# Patient Record
Sex: FEMALE | Race: WHITE | NOT HISPANIC OR LATINO | Employment: OTHER | URBAN - METROPOLITAN AREA
[De-identification: names, ages, dates, MRNs, and addresses within clinical notes are randomized per-mention and may not be internally consistent; named-entity substitution may affect disease eponyms.]

---

## 2017-10-17 ENCOUNTER — HOSPITAL ENCOUNTER (EMERGENCY)
Facility: HOSPITAL | Age: 77
Discharge: HOME | End: 2017-10-17
Attending: EMERGENCY MEDICINE | Admitting: EMERGENCY MEDICINE
Payer: MEDICARE

## 2017-10-17 ENCOUNTER — APPOINTMENT (EMERGENCY)
Dept: RADIOLOGY | Facility: HOSPITAL | Age: 77
End: 2017-10-17
Payer: MEDICARE

## 2017-10-17 ENCOUNTER — APPOINTMENT (EMERGENCY)
Dept: CT IMAGING | Facility: HOSPITAL | Age: 77
End: 2017-10-17
Payer: MEDICARE

## 2017-10-17 VITALS
SYSTOLIC BLOOD PRESSURE: 215 MMHG | HEIGHT: 67 IN | WEIGHT: 148.59 LBS | BODY MASS INDEX: 23.32 KG/M2 | HEART RATE: 78 BPM | DIASTOLIC BLOOD PRESSURE: 90 MMHG | RESPIRATION RATE: 20 BRPM | OXYGEN SATURATION: 99 %

## 2017-10-17 DIAGNOSIS — S29.9XXA INJURY OF CHEST WALL, INITIAL ENCOUNTER: ICD-10-CM
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
## 2017-10-17 LAB
ALBUMIN SERPL BCP-MCNC: 3.5 G/DL (ref 3.5–5)
ALP SERPL-CCNC: 109 U/L (ref 46–116)
ALT SERPL W P-5'-P-CCNC: 29 U/L (ref 12–78)
ANION GAP SERPL CALCULATED.3IONS-SCNC: 10 MMOL/L (ref 4–13)
AST SERPL W P-5'-P-CCNC: 25 U/L (ref 5–45)
BASOPHILS # BLD AUTO: 0.02 THOUSANDS/ΜL (ref 0–0.1)
BASOPHILS NFR BLD AUTO: 0 % (ref 0–1)
BILIRUB SERPL-MCNC: 0.5 MG/DL (ref 0.2–1)
BUN SERPL-MCNC: 24 MG/DL (ref 5–25)
CALCIUM SERPL-MCNC: 9.9 MG/DL (ref 8.3–10.1)
CHLORIDE SERPL-SCNC: 99 MMOL/L (ref 100–108)
CO2 SERPL-SCNC: 27 MMOL/L (ref 21–32)
CREAT SERPL-MCNC: 1.06 MG/DL (ref 0.6–1.3)
EOSINOPHIL # BLD AUTO: 0.05 THOUSAND/ΜL (ref 0–0.61)
EOSINOPHIL NFR BLD AUTO: 0 % (ref 0–6)
ERYTHROCYTE [DISTWIDTH] IN BLOOD BY AUTOMATED COUNT: 13.9 % (ref 11.6–15.1)
GFR SERPL CREATININE-BSD FRML MDRD: 50 ML/MIN/1.73SQ M
GLUCOSE SERPL-MCNC: 127 MG/DL (ref 65–140)
HCT VFR BLD AUTO: 39.5 % (ref 34.8–46.1)
HGB BLD-MCNC: 12.4 G/DL (ref 11.5–15.4)
LYMPHOCYTES # BLD AUTO: 0.58 THOUSANDS/ΜL (ref 0.6–4.47)
LYMPHOCYTES NFR BLD AUTO: 4 % (ref 14–44)
MCH RBC QN AUTO: 28.5 PG (ref 26.8–34.3)
MCHC RBC AUTO-ENTMCNC: 31.4 G/DL (ref 31.4–37.4)
MCV RBC AUTO: 91 FL (ref 82–98)
MONOCYTES # BLD AUTO: 0.89 THOUSAND/ΜL (ref 0.17–1.22)
MONOCYTES NFR BLD AUTO: 7 % (ref 4–12)
NEUTROPHILS # BLD AUTO: 11.9 THOUSANDS/ΜL (ref 1.85–7.62)
NEUTS SEG NFR BLD AUTO: 89 % (ref 43–75)
PLATELET # BLD AUTO: 345 THOUSANDS/UL (ref 149–390)
PMV BLD AUTO: 10.6 FL (ref 8.9–12.7)
POTASSIUM SERPL-SCNC: 3.3 MMOL/L (ref 3.5–5.3)
PROT SERPL-MCNC: 6.9 G/DL (ref 6.4–8.2)
RBC # BLD AUTO: 4.35 MILLION/UL (ref 3.81–5.12)
SODIUM SERPL-SCNC: 136 MMOL/L (ref 136–145)
TROPONIN I SERPL-MCNC: <0.02 NG/ML
WBC # BLD AUTO: 13.44 THOUSAND/UL (ref 4.31–10.16)

## 2017-10-17 PROCEDURE — 70450 CT HEAD/BRAIN W/O DYE: CPT

## 2017-10-17 PROCEDURE — 36415 COLL VENOUS BLD VENIPUNCTURE: CPT | Performed by: PHYSICIAN ASSISTANT

## 2017-10-17 PROCEDURE — 71100 X-RAY EXAM RIBS UNI 2 VIEWS: CPT

## 2017-10-17 PROCEDURE — 93005 ELECTROCARDIOGRAM TRACING: CPT

## 2017-10-17 PROCEDURE — 80053 COMPREHEN METABOLIC PANEL: CPT | Performed by: PHYSICIAN ASSISTANT

## 2017-10-17 PROCEDURE — 85025 COMPLETE CBC W/AUTO DIFF WBC: CPT | Performed by: PHYSICIAN ASSISTANT

## 2017-10-17 PROCEDURE — 84484 ASSAY OF TROPONIN QUANT: CPT | Performed by: PHYSICIAN ASSISTANT

## 2017-10-17 PROCEDURE — 96360 HYDRATION IV INFUSION INIT: CPT

## 2017-10-17 PROCEDURE — 71020 HB CHEST X-RAY 2VW FRONTAL&LATL: CPT

## 2017-10-17 PROCEDURE — 99285 EMERGENCY DEPT VISIT HI MDM: CPT

## 2017-10-17 RX ORDER — VALSARTAN AND HYDROCHLOROTHIAZIDE 80; 12.5 MG/1; MG/1
1 TABLET, FILM COATED ORAL DAILY
COMMUNITY

## 2017-10-17 RX ORDER — ESCITALOPRAM OXALATE 5 MG/1
5 TABLET ORAL DAILY
COMMUNITY

## 2017-10-17 RX ORDER — OXYCODONE HYDROCHLORIDE AND ACETAMINOPHEN 5; 325 MG/1; MG/1
1 TABLET ORAL ONCE
Status: COMPLETED | OUTPATIENT
Start: 2017-10-17 | End: 2017-10-17

## 2017-10-17 RX ORDER — OMEGA-3S/DHA/EPA/FISH OIL/D3 300MG-1000
400 CAPSULE ORAL DAILY
COMMUNITY

## 2017-10-17 RX ORDER — ATORVASTATIN CALCIUM 20 MG/1
20 TABLET, FILM COATED ORAL DAILY
COMMUNITY

## 2017-10-17 RX ORDER — OXYCODONE HYDROCHLORIDE AND ACETAMINOPHEN 5; 325 MG/1; MG/1
1 TABLET ORAL EVERY 6 HOURS PRN
Qty: 12 TABLET | Refills: 0 | Status: SHIPPED | OUTPATIENT
Start: 2017-10-17 | End: 2017-10-27

## 2017-10-17 RX ORDER — MEMANTINE HYDROCHLORIDE 5 MG-10 MG
28 KIT ORAL DAILY
COMMUNITY

## 2017-10-17 RX ORDER — UBIDECARENONE 75 MG
CAPSULE ORAL DAILY
COMMUNITY

## 2017-10-17 RX ORDER — DONEPEZIL HYDROCHLORIDE 5 MG/1
5 TABLET, FILM COATED ORAL
COMMUNITY

## 2017-10-17 RX ADMIN — SODIUM CHLORIDE 1000 ML: 0.9 INJECTION, SOLUTION INTRAVENOUS at 15:42

## 2017-10-17 RX ADMIN — OXYCODONE HYDROCHLORIDE AND ACETAMINOPHEN 1 TABLET: 5; 325 TABLET ORAL at 17:10

## 2017-10-17 NOTE — DISCHARGE INSTRUCTIONS
Chest Wall Pain   WHAT YOU NEED TO KNOW:   Chest wall pain may be caused by problems with the muscles, cartilage, or bones of the chest wall  Chest wall pain may also be caused by pain that spreads to your chest from another part of your body  The pain may be aching, severe, dull, or sharp  It may come and go, or it may be constant  The pain may be worse when you move in certain ways, breathe deeply, or cough  DISCHARGE INSTRUCTIONS:   Call 911 if:   · You have any of the following signs of a heart attack:      ¨ Squeezing, pressure, or pain in your chest that lasts longer than 5 minutes or returns    ¨ Discomfort or pain in your back, neck, jaw, stomach, or arm     ¨ Trouble breathing    ¨ Nausea or vomiting    ¨ Lightheadedness or a sudden cold sweat, especially with chest pain or trouble breathing    Return to the emergency department if:   · You have severe pain  Contact your healthcare provider if:   · You develop a rash  · You have other new symptoms  · Your pain does not improve, even with treatment  · You have questions or concerns about your condition or care  Medicines: You may need any of the following:  · NSAIDs , such as ibuprofen, help decrease swelling, pain, and fever  This medicine is available with or without a doctor's order  NSAIDs can cause stomach bleeding or kidney problems in certain people  If you take blood thinner medicine, always ask your healthcare provider if NSAIDs are safe for you  Always read the medicine label and follow directions  · Acetaminophen  decreases pain  It is available without a doctor's order  Ask how much to take and how often to take it  Follow directions  Acetaminophen can cause liver damage if not taken correctly  · A cream  may be applied to your chest to decrease pain  · Take your medicine as directed  Contact your healthcare provider if you think your medicine is not helping or if you have side effects   Tell him of her if you are allergic to any medicine  Keep a list of the medicines, vitamins, and herbs you take  Include the amounts, and when and why you take them  Bring the list or the pill bottles to follow-up visits  Carry your medicine list with you in case of an emergency  Follow up with your healthcare provider as directed:  Write down your questions so you remember to ask them during your visits  Self-care:   · Rest  as needed  Avoid activities that make your chest wall pain worse  · Apply heat  on your chest for 20 to 30 minutes every 2 hours for as many days as directed  Heat helps decrease pain and muscle spasms  · Apply ice  on your chest for 15 to 20 minutes every hour or as directed  Use an ice pack, or put crushed ice in a plastic bag  Cover it with a towel  Ice helps prevent tissue damage and decreases swelling and pain  © 2017 [employer]  Information is for End User's use only and may not be sold, redistributed or otherwise used for commercial purposes  All illustrations and images included in CareNotes® are the copyrighted property of [employer]  or [employer]  The above information is an  only  It is not intended as medical advice for individual conditions or treatments  Talk to your doctor, nurse or pharmacist before following any medical regimen to see if it is safe and effective for you

## 2017-10-17 NOTE — ED NOTES
Pt awake and alert, no distress noted, no other questions upon d/c     April ANNI Abernathy, RN  10/17/17 8101

## 2017-10-17 NOTE — ED PROVIDER NOTES
History  Chief Complaint   Patient presents with    Fall     per paramedic patient was helping family with yardwork when she fell backwards, denies loss of consciousness  Patient now complains of right flank area pain  denies pain upon spinal palpation  68-year-old female presents to the emergency department with complaints of right-sided back pain after a fall  Son states that she was getting up from a seated position outside and thinks she may have gotten dizzy  States that she fell backward and hit her back on a pile of wood  Denies head injury  No loss of consciousness  Patient does not remember the fall  Presently complaining of pain in the right side of the upper back  Denies shortness of breath or difficulty breathing  She has not had any changes in her speech or vision since the time of the injury  Denies weakness in the upper or lower extremities  Son states she is currently being followed by Neurology for memory issues and has upcoming MRI  States that he has noticed she has been less steady on her feet over the past 5-7 days but has not had any previous fall  History provided by:  Patient   used: No    Fall   Associated symptoms: chest pain    Associated symptoms: no abdominal pain, no headaches, no nausea, no seizures and no vomiting        Prior to Admission Medications   Prescriptions Last Dose Informant Patient Reported? Taking?   atorvastatin (LIPITOR) 20 mg tablet   Yes Yes   Sig: Take 20 mg by mouth daily   cholecalciferol (VITAMIN D3) 400 units tablet   Yes Yes   Sig: Take 400 Units by mouth daily   cyanocobalamin (VITAMIN B-12) 100 mcg tablet   Yes Yes   Sig: Take by mouth daily   donepezil (ARICEPT) 5 mg tablet   Yes Yes   Sig: Take 5 mg by mouth daily at bedtime   escitalopram (LEXAPRO) 5 mg tablet   Yes Yes   Sig: Take 5 mg by mouth daily   memantine (NAMENDA TITRATION PACK)   Yes Yes   Sig: Take 28 mg by mouth daily Follow package directions  valsartan-hydrochlorothiazide (DIOVAN-HCT) 80-12 5 MG per tablet   Yes Yes   Sig: Take 1 tablet by mouth daily      Facility-Administered Medications: None       Past Medical History:   Diagnosis Date    Hypertension        History reviewed  No pertinent surgical history  History reviewed  No pertinent family history  I have reviewed and agree with the history as documented  Social History   Substance Use Topics    Smoking status: Never Smoker    Smokeless tobacco: Not on file    Alcohol use Not on file        Review of Systems   Constitutional: Negative for activity change, appetite change, chills and fever  HENT: Negative for congestion, dental problem, drooling, ear discharge, ear pain, mouth sores, nosebleeds, rhinorrhea, sore throat and trouble swallowing  Eyes: Negative for pain, discharge and itching  Respiratory: Negative for cough, chest tightness, shortness of breath and wheezing  Cardiovascular: Positive for chest pain  Negative for palpitations  Gastrointestinal: Negative for abdominal pain, blood in stool, constipation, diarrhea, nausea and vomiting  Endocrine: Negative for cold intolerance and heat intolerance  Genitourinary: Negative for difficulty urinating, dysuria, flank pain, frequency and urgency  Skin: Negative for rash and wound  Allergic/Immunologic: Negative for food allergies and immunocompromised state  Neurological: Positive for syncope  Negative for dizziness, seizures, weakness, numbness and headaches  Psychiatric/Behavioral: Negative for agitation, behavioral problems and confusion         Physical Exam  ED Triage Vitals   Temp Pulse Respirations Blood Pressure SpO2   -- 10/17/17 1445 10/17/17 1445 10/17/17 1445 10/17/17 1445    73 16 115/55 96 %      Temp src Heart Rate Source Patient Position - Orthostatic VS BP Location FiO2 (%)   -- 10/17/17 1445 10/17/17 1452 10/17/17 1445 --    Monitor Lying Right arm       Pain Score       10/17/17 1445       1 Physical Exam   Constitutional: She is oriented to person, place, and time  She appears well-developed and well-nourished  No distress  HENT:   Head: Normocephalic and atraumatic  Right Ear: External ear normal    Left Ear: External ear normal    Mouth/Throat: Oropharynx is clear and moist  No oropharyngeal exudate  Eyes: Conjunctivae and EOM are normal  Pupils are equal, round, and reactive to light  Neck: No JVD present  No tracheal deviation present  Cardiovascular: Normal rate, regular rhythm and normal heart sounds  Exam reveals no gallop and no friction rub  No murmur heard  Pulmonary/Chest: Effort normal and breath sounds normal  No respiratory distress  She has no wheezes  She has no rales  She exhibits no tenderness  Abdominal: Soft  Bowel sounds are normal  She exhibits no distension  There is no tenderness  There is no guarding  Musculoskeletal: Normal range of motion  She exhibits no edema, tenderness or deformity  Lymphadenopathy:     She has no cervical adenopathy  Neurological: She is alert and oriented to person, place, and time  Skin: Skin is warm and dry  No rash noted  She is not diaphoretic  No erythema  Psychiatric: She has a normal mood and affect  Her behavior is normal    Nursing note and vitals reviewed        ED Medications  Medications    EMS REPLENISHMENT MED ( Does not apply Given to EMS 10/17/17 1500)   sodium chloride 0 9 % bolus 1,000 mL (1,000 mL Intravenous New Bag 10/17/17 1542)   oxyCODONE-acetaminophen (PERCOCET) 5-325 mg per tablet 1 tablet (1 tablet Oral Given 10/17/17 1710)       Diagnostic Studies  Labs Reviewed   CBC AND DIFFERENTIAL - Abnormal        Result Value Ref Range Status    WBC 13 44 (*) 4 31 - 10 16 Thousand/uL Final    Neutrophils Relative 89 (*) 43 - 75 % Final    Lymphocytes Relative 4 (*) 14 - 44 % Final    Neutrophils Absolute 11 90 (*) 1 85 - 7 62 Thousands/µL Final    Lymphocytes Absolute 0 58 (*) 0 60 - 4 47 Thousands/µL Final    RBC 4 35  3 81 - 5 12 Million/uL Final    Hemoglobin 12 4  11 5 - 15 4 g/dL Final    Hematocrit 39 5  34 8 - 46 1 % Final    MCV 91  82 - 98 fL Final    MCH 28 5  26 8 - 34 3 pg Final    MCHC 31 4  31 4 - 37 4 g/dL Final    RDW 13 9  11 6 - 15 1 % Final    MPV 10 6  8 9 - 12 7 fL Final    Platelets 751  599 - 390 Thousands/uL Final    Monocytes Relative 7  4 - 12 % Final    Eosinophils Relative 0  0 - 6 % Final    Basophils Relative 0  0 - 1 % Final    Monocytes Absolute 0 89  0 17 - 1 22 Thousand/µL Final    Eosinophils Absolute 0 05  0 00 - 0 61 Thousand/µL Final    Basophils Absolute 0 02  0 00 - 0 10 Thousands/µL Final   COMPREHENSIVE METABOLIC PANEL - Abnormal     Potassium 3 3 (*) 3 5 - 5 3 mmol/L Final    Chloride 99 (*) 100 - 108 mmol/L Final    Sodium 136  136 - 145 mmol/L Final    CO2 27  21 - 32 mmol/L Final    Anion Gap 10  4 - 13 mmol/L Final    BUN 24  5 - 25 mg/dL Final    Creatinine 1 06  0 60 - 1 30 mg/dL Final    Comment: Standardized to IDMS reference method    Glucose 127  65 - 140 mg/dL Final    Comment:   If the patient is fasting, the ADA then defines impaired fasting glucose as > 100 mg/dL and diabetes as > or equal to 123 mg/dL  Specimen collection should occur prior to Sulfasalazine administration due to the potential for falsely depressed results  Specimen collection should occur prior to Sulfapyridine administration due to the potential for falsely elevated results  Calcium 9 9  8 3 - 10 1 mg/dL Final    AST 25  5 - 45 U/L Final    Comment:   Specimen collection should occur prior to Sulfasalazine administration due to the potential for falsely depressed results  ALT 29  12 - 78 U/L Final    Comment:   Specimen collection should occur prior to Sulfasalazine administration due to the potential for falsely depressed results       Alkaline Phosphatase 109  46 - 116 U/L Final    Total Protein 6 9  6 4 - 8 2 g/dL Final    Albumin 3 5  3 5 - 5 0 g/dL Final    Total Bilirubin 0 50  0 20 - 1 00 mg/dL Final    eGFR 50  ml/min/1 73sq m Final    Narrative:     National Kidney Disease Education Program recommendations are as follows:  GFR calculation is accurate only with a steady state creatinine  Chronic Kidney disease less than 60 ml/min/1 73 sq  meters  Kidney failure less than 15 ml/min/1 73 sq  meters  TROPONIN I - Normal    Troponin I <0 02  <=0 04 ng/mL Final    Narrative:     [employer] Chemistry analyzer 99% cutoff is > 0 04 ng/mL in network labs    o cTnI 99% cutoff is useful only when applied to patients in the clinical setting of myocardial ischemia  o cTnI 99% cutoff should be interpreted in the context of clinical history, ECG findings and possibly cardiac imaging to establish correct diagnosis  o cTnI 99% cutoff may be suggestive but clearly not indicative of a coronary event without the clinical setting of myocardial ischemia  POCT URINALYSIS DIPSTICK       XR ribs 2 vw right   ED Interpretation   No fracture      Final Result      No displaced rib fracture  Findings concur with the preliminary report by the referring clinician already in PACS and/or our electronic record EPIC  Workstation performed: [id number]         XR chest 2 views   ED Interpretation   Clear lungs      Final Result      No active pulmonary disease  Workstation performed: [id number]         CT head without contrast   Final Result   1  No evidence of acute intracranial hemorrhage  2  Volume loss/atrophy and microangiopathy   3  Enophthalmos  4  Old appearing nasal fractures, correlate clinically           Workstation performed: [id number]             Procedures  ECG 12 Lead Documentation  Date/Time: 10/17/2017 3:12 PM  Performed by: Russell Quintero  Authorized by: Suresh Gee     Indications / Diagnosis:  Syncope  Patient location:  ED  Previous ECG:     Previous ECG:  Unavailable  Interpretation:     Interpretation: normal    Rate:     ECG rate:  72    ECG rate assessment: normal    Rhythm:     Rhythm: sinus rhythm    Ectopy:     Ectopy: none    QRS:     QRS axis:  Normal    QRS intervals:  Normal  Conduction:     Conduction: normal    ST segments:     ST segments:  Normal  T waves:     T waves: normal            Phone Contacts  ED Phone Contact    ED Course  ED Course                                MDM  Number of Diagnoses or Management Options  Fall, initial encounter:   Injury of chest wall, initial encounter:   Diagnosis management comments: Differential diagnosis includes but not limited to:  Vasovagal syncope, mechanical fall, contusion of chest wall, rib fracture  Doubt TIA or CVA  Amount and/or Complexity of Data Reviewed  Clinical lab tests: ordered and reviewed  Tests in the radiology section of CPT®: ordered and reviewed      CritCare Time    Disposition  Final diagnoses:   Fall, initial encounter   Injury of chest wall, initial encounter     ED Disposition     ED Disposition Condition Comment    Discharge  Nika Schultz discharge to home/self care  Condition at discharge: Stable        Follow-up Information    None       Patient's Medications   Discharge Prescriptions    OXYCODONE-ACETAMINOPHEN (PERCOCET) 5-325 MG PER TABLET    Take 1 tablet by mouth every 6 (six) hours as needed for moderate pain for up to 10 days Max Daily Amount: 4 tablets       Start Date: 10/17/2017End Date: 10/27/2017       Order Dose: 1 tablet       Quantity: 12 tablet    Refills: 0     No discharge procedures on file      ED Provider  Electronically Signed by       Brenda Trimble PA-C  10/17/17 1119

## 2017-10-19 LAB
ATRIAL RATE: 72 BPM
P AXIS: 80 DEGREES
PR INTERVAL: 130 MS
QRS AXIS: 38 DEGREES
QRSD INTERVAL: 74 MS
QT INTERVAL: 372 MS
QTC INTERVAL: 407 MS
T WAVE AXIS: 40 DEGREES
VENTRICULAR RATE: 72 BPM

## 2019-08-03 ENCOUNTER — HOSPITAL ENCOUNTER (EMERGENCY)
Facility: HOSPITAL | Age: 79
Discharge: HOME | End: 2019-08-03
Attending: EMERGENCY MEDICINE | Admitting: EMERGENCY MEDICINE
Payer: COMMERCIAL

## 2019-08-03 ENCOUNTER — APPOINTMENT (EMERGENCY)
Dept: RADIOLOGY | Facility: HOSPITAL | Age: 79
End: 2019-08-03
Payer: COMMERCIAL

## 2019-08-03 VITALS
SYSTOLIC BLOOD PRESSURE: 132 MMHG | DIASTOLIC BLOOD PRESSURE: 59 MMHG | HEART RATE: 77 BPM | OXYGEN SATURATION: 97 % | BODY MASS INDEX: 24.75 KG/M2 | WEIGHT: 158 LBS | TEMPERATURE: 98.3 F | RESPIRATION RATE: 18 BRPM

## 2019-08-03 DIAGNOSIS — R55 VASOVAGAL NEAR SYNCOPE: Primary | ICD-10-CM

## 2019-08-03 LAB
ALBUMIN SERPL BCP-MCNC: 3.3 G/DL (ref 3.5–5)
ALP SERPL-CCNC: 107 U/L (ref 46–116)
ALT SERPL W P-5'-P-CCNC: 28 U/L (ref 12–78)
ANION GAP SERPL CALCULATED.3IONS-SCNC: 11 MMOL/L (ref 4–13)
AST SERPL W P-5'-P-CCNC: 15 U/L (ref 5–45)
BASOPHILS # BLD AUTO: 0.04 THOUSANDS/ΜL (ref 0–0.1)
BASOPHILS NFR BLD AUTO: 1 % (ref 0–1)
BILIRUB SERPL-MCNC: 0.55 MG/DL (ref 0.2–1)
BUN SERPL-MCNC: 23 MG/DL (ref 5–25)
CALCIUM SERPL-MCNC: 9.2 MG/DL (ref 8.3–10.1)
CHLORIDE SERPL-SCNC: 107 MMOL/L (ref 100–108)
CO2 SERPL-SCNC: 24 MMOL/L (ref 21–32)
CREAT SERPL-MCNC: 1.11 MG/DL (ref 0.6–1.3)
EOSINOPHIL # BLD AUTO: 0.08 THOUSAND/ΜL (ref 0–0.61)
EOSINOPHIL NFR BLD AUTO: 1 % (ref 0–6)
ERYTHROCYTE [DISTWIDTH] IN BLOOD BY AUTOMATED COUNT: 13.3 % (ref 11.6–15.1)
GFR SERPL CREATININE-BSD FRML MDRD: 47 ML/MIN/1.73SQ M
GLUCOSE SERPL-MCNC: 114 MG/DL (ref 65–140)
HCT VFR BLD AUTO: 38.1 % (ref 34.8–46.1)
HGB BLD-MCNC: 12.1 G/DL (ref 11.5–15.4)
IMM GRANULOCYTES # BLD AUTO: 0.03 THOUSAND/UL (ref 0–0.2)
IMM GRANULOCYTES NFR BLD AUTO: 0 % (ref 0–2)
LYMPHOCYTES # BLD AUTO: 1.12 THOUSANDS/ΜL (ref 0.6–4.47)
LYMPHOCYTES NFR BLD AUTO: 13 % (ref 14–44)
MCH RBC QN AUTO: 29.5 PG (ref 26.8–34.3)
MCHC RBC AUTO-ENTMCNC: 31.8 G/DL (ref 31.4–37.4)
MCV RBC AUTO: 93 FL (ref 82–98)
MONOCYTES # BLD AUTO: 0.61 THOUSAND/ΜL (ref 0.17–1.22)
MONOCYTES NFR BLD AUTO: 7 % (ref 4–12)
NEUTROPHILS # BLD AUTO: 6.92 THOUSANDS/ΜL (ref 1.85–7.62)
NEUTS SEG NFR BLD AUTO: 78 % (ref 43–75)
NRBC BLD AUTO-RTO: 0 /100 WBCS
PLATELET # BLD AUTO: 325 THOUSANDS/UL (ref 149–390)
PMV BLD AUTO: 10.6 FL (ref 8.9–12.7)
POTASSIUM SERPL-SCNC: 3.6 MMOL/L (ref 3.5–5.3)
PROT SERPL-MCNC: 6.4 G/DL (ref 6.4–8.2)
RBC # BLD AUTO: 4.1 MILLION/UL (ref 3.81–5.12)
SODIUM SERPL-SCNC: 142 MMOL/L (ref 136–145)
TROPONIN I SERPL-MCNC: <0.02 NG/ML
WBC # BLD AUTO: 8.8 THOUSAND/UL (ref 4.31–10.16)

## 2019-08-03 PROCEDURE — 84484 ASSAY OF TROPONIN QUANT: CPT | Performed by: EMERGENCY MEDICINE

## 2019-08-03 PROCEDURE — 93005 ELECTROCARDIOGRAM TRACING: CPT

## 2019-08-03 PROCEDURE — 99285 EMERGENCY DEPT VISIT HI MDM: CPT

## 2019-08-03 PROCEDURE — 71046 X-RAY EXAM CHEST 2 VIEWS: CPT

## 2019-08-03 PROCEDURE — 99284 EMERGENCY DEPT VISIT MOD MDM: CPT | Performed by: EMERGENCY MEDICINE

## 2019-08-03 PROCEDURE — 96360 HYDRATION IV INFUSION INIT: CPT

## 2019-08-03 PROCEDURE — 85025 COMPLETE CBC W/AUTO DIFF WBC: CPT | Performed by: EMERGENCY MEDICINE

## 2019-08-03 PROCEDURE — 36415 COLL VENOUS BLD VENIPUNCTURE: CPT | Performed by: EMERGENCY MEDICINE

## 2019-08-03 PROCEDURE — 80053 COMPREHEN METABOLIC PANEL: CPT | Performed by: EMERGENCY MEDICINE

## 2019-08-03 RX ADMIN — SODIUM CHLORIDE 1000 ML: 0.9 INJECTION, SOLUTION INTRAVENOUS at 15:48

## 2019-08-03 NOTE — ED ATTENDING ATTESTATION
Fidel Zamorano DO, saw and evaluated the patient  I have discussed the patient with the resident/non-physician practitioner and agree with the resident's/non-physician practitioner's findings, Plan of Care, and MDM as documented in the resident's/non-physician practitioner's note, except where noted  All available labs and Radiology studies were reviewed  I was present for key portions of any procedure(s) performed by the resident/non-physician practitioner and I was immediately available to provide assistance  At this point I agree with the current assessment done in the Emergency Department  I have conducted an independent evaluation of this patient a history and physical is as follows:      [de-identified]year old female with near syncope  Patient was walking around music fast   She became lightheaded  She sat down and felt better  Has a history dementia, history is limited family can provide some of the history    Plan is check basic labs IV fluids reassess for likely discharge      Critical Care Time  Procedures

## 2019-08-03 NOTE — ED PROVIDER NOTES
History  Chief Complaint   Patient presents with    Weakness - Generalized     [place] [facility] [place]  Family states that pt has not drank much water     HPI  [de-identified] y o  Female with PMH hypertension and Alzheimer's dementia presents to the ED s/p episode of near syncope this afternoon  Pt was reportedly outside at [place] walking with her daughter in law when she began to feel lightheaded  They sat her on a chair under a tent and gave her some water and pt began to feel better  Son reports pt has had one similar episode to this 2 months ago while walking in [address], and she again improved after sitting for several minutes  Pt is not oriented to date or time at baseline  She does not remember the event today, but she has not complaints at this time and denies lightheadedness, dizziness, headache, chest pain, palpitations, shortness of breath, or abdominal pain  She has not been sick recently with cough, cold, or fever  No hx cardiac problems  She did not fall or hit her head or lose consciousness  Prior to Admission Medications   Prescriptions Last Dose Informant Patient Reported? Taking?   atorvastatin (LIPITOR) 20 mg tablet   Yes Yes   Sig: Take 20 mg by mouth daily   cholecalciferol (VITAMIN D3) 400 units tablet   Yes Yes   Sig: Take 400 Units by mouth daily   cyanocobalamin (VITAMIN B-12) 100 mcg tablet   Yes Yes   Sig: Take by mouth daily   donepezil (ARICEPT) 5 mg tablet   Yes Yes   Sig: Take 5 mg by mouth daily at bedtime   escitalopram (LEXAPRO) 5 mg tablet   Yes Yes   Sig: Take 5 mg by mouth daily   memantine (NAMENDA TITRATION PACK)   Yes No   Sig: Take 28 mg by mouth daily Follow package directions  valsartan-hydrochlorothiazide (DIOVAN-HCT) 80-12 5 MG per tablet   Yes Yes   Sig: Take 1 tablet by mouth daily      Facility-Administered Medications: None       Past Medical History:   Diagnosis Date    Hypertension        History reviewed   No pertinent surgical history  History reviewed  No pertinent family history  I have reviewed and agree with the history as documented  Social History     Tobacco Use    Smoking status: Never Smoker    Smokeless tobacco: Never Used   Substance Use Topics    Alcohol use: Never     Frequency: Never    Drug use: No        Review of Systems   Constitutional: Negative for chills and fever  HENT: Negative for congestion, rhinorrhea and sore throat  Eyes: Negative for visual disturbance  Respiratory: Negative for chest tightness and shortness of breath  Cardiovascular: Negative for chest pain  Gastrointestinal: Negative for abdominal pain, diarrhea, nausea and vomiting  Genitourinary: Negative for dysuria and hematuria  Musculoskeletal: Negative for arthralgias and myalgias  Skin: Negative for rash  Neurological: Positive for syncope  Negative for dizziness, weakness, light-headedness, numbness and headaches  Psychiatric/Behavioral: Positive for confusion  All other systems reviewed and are negative  Physical Exam  ED Triage Vitals   Temperature Pulse Respirations Blood Pressure SpO2   08/03/19 1521 08/03/19 1521 08/03/19 1521 08/03/19 1523 08/03/19 1521   98 3 °F (36 8 °C) 84 18 108/58 98 %      Temp Source Heart Rate Source Patient Position - Orthostatic VS BP Location FiO2 (%)   08/03/19 1521 08/03/19 1521 08/03/19 1521 08/03/19 1521 --   Oral Monitor Sitting Right arm       Pain Score       08/03/19 1521       No Pain             Orthostatic Vital Signs  Vitals:    08/03/19 1521 08/03/19 1523 08/03/19 1618   BP:  108/58 132/59   Pulse: 84  77   Patient Position - Orthostatic VS: Sitting  Lying       Physical Exam   Constitutional: She appears well-developed and well-nourished  No distress  HENT:   Head: Normocephalic and atraumatic  Right Ear: External ear normal    Left Ear: External ear normal    Nose: Nose normal    Eyes: Pupils are equal, round, and reactive to light   Conjunctivae and EOM are normal    Neck: Normal range of motion  Neck supple  Cardiovascular: Normal rate, regular rhythm, normal heart sounds and intact distal pulses  Exam reveals no gallop and no friction rub  No murmur heard  Pulmonary/Chest: Effort normal and breath sounds normal  No respiratory distress  She has no wheezes  She has no rhonchi  She has no rales  Abdominal: Soft  Bowel sounds are normal  She exhibits no distension and no mass  There is no tenderness  There is no rebound and no guarding  Musculoskeletal: Normal range of motion  Lymphadenopathy:     She has no cervical adenopathy  Neurological: She is alert  She has normal strength  No cranial nerve deficit or sensory deficit  Oriented only to self, at her baseline   Skin: Skin is warm and dry  She is not diaphoretic  Psychiatric: She has a normal mood and affect  Nursing note and vitals reviewed        ED Medications  Medications   sodium chloride 0 9 % bolus 1,000 mL (1,000 mL Intravenous New Bag 8/3/19 7705)       Diagnostic Studies  Results Reviewed     Procedure Component Value Units Date/Time    Troponin I [[id number]]  (Normal) Collected:  08/03/19 1529    Lab Status:  Final result Specimen:  Blood from Arm, Right Updated:  08/03/19 1607     Troponin I <0 02 ng/mL     Comprehensive metabolic panel [[id number]]  (Abnormal) Collected:  08/03/19 1529    Lab Status:  Final result Specimen:  Blood from Arm, Right Updated:  08/03/19 1606     Sodium 142 mmol/L      Potassium 3 6 mmol/L      Chloride 107 mmol/L      CO2 24 mmol/L      ANION GAP 11 mmol/L      BUN 23 mg/dL      Creatinine 1 11 mg/dL      Glucose 114 mg/dL      Calcium 9 2 mg/dL      AST 15 U/L      ALT 28 U/L      Alkaline Phosphatase 107 U/L      Total Protein 6 4 g/dL      Albumin 3 3 g/dL      Total Bilirubin 0 55 mg/dL      eGFR 47 ml/min/1 73sq m     Narrative:       [place] guidelines for Chronic Kidney Disease (CKD):     Stage 1 with normal or high GFR (GFR > 90 mL/min/1 73 square meters)    Stage 2 Mild CKD (GFR = 60-89 mL/min/1 73 square meters)    Stage 3A Moderate CKD (GFR = 45-59 mL/min/1 73 square meters)    Stage 3B Moderate CKD (GFR = 30-44 mL/min/1 73 square meters)    Stage 4 Severe CKD (GFR = 15-29 mL/min/1 73 square meters)    Stage 5 End Stage CKD (GFR <15 mL/min/1 73 square meters)  Note: GFR calculation is accurate only with a steady state creatinine    CBC and differential [[id number]]  (Abnormal) Collected:  08/03/19 1529    Lab Status:  Final result Specimen:  Blood from Arm, Right Updated:  08/03/19 1539     WBC 8 80 Thousand/uL      RBC 4 10 Million/uL      Hemoglobin 12 1 g/dL      Hematocrit 38 1 %      MCV 93 fL      MCH 29 5 pg      MCHC 31 8 g/dL      RDW 13 3 %      MPV 10 6 fL      Platelets 842 Thousands/uL      nRBC 0 /100 WBCs      Neutrophils Relative 78 %      Immat GRANS % 0 %      Lymphocytes Relative 13 %      Monocytes Relative 7 %      Eosinophils Relative 1 %      Basophils Relative 1 %      Neutrophils Absolute 6 92 Thousands/µL      Immature Grans Absolute 0 03 Thousand/uL      Lymphocytes Absolute 1 12 Thousands/µL      Monocytes Absolute 0 61 Thousand/µL      Eosinophils Absolute 0 08 Thousand/µL      Basophils Absolute 0 04 Thousands/µL                  XR chest 2 views   ED Interpretation by Landy Vasquez MD (08/03 1644)   No consolidation, effusion, or ptx            Procedures  ECG 12 Lead Documentation Only  Date/Time: 8/3/2019 4:04 PM  Performed by: Landy Vasquez MD  Authorized by: Landy Vasquez MD     ECG reviewed by me, the ED Provider: yes    Patient location:  ED  Previous ECG:     Previous ECG:  Unavailable  Interpretation:     Interpretation: non-specific    Rate:     ECG rate:  81    ECG rate assessment: normal    Rhythm:     Rhythm: other rhythm      Rhythm comment:  Sinus arrhythmia   Ectopy:     Ectopy: none    QRS:     QRS axis:  Normal  Conduction:     Conduction: normal    ST segments:     ST segments:  Normal  T waves:     T waves: normal              ED Course  ED Course as of Aug 03 1649   Sat Aug 03, 2019   1646 Pt continues to be asymptomatic  Son is agreeable to discharge  Pt lives with him at home  She will follow up with PCP and return for any repeat episodes  Identification of Seniors at Risk      Most Recent Value   (ISAR) Identification of Seniors at Risk   Before the illness or injury that brought you to the Emergency, did you need someone to help you on a regular basis? 1 Filed at: 08/03/2019 1523   In the last 24 hours, have you needed more help than usual?  1 Filed at: 08/03/2019 1523   Have you been hospitalized for one or more nights during the past 6 months? 0 Filed at: 08/03/2019 1523   In general, do you see well?  0 Filed at: 08/03/2019 1523   In general, do you have serious problems with your memory? 1 Filed at: 08/03/2019 1523   Do you take more than three different medications every day? 1 Filed at: 08/03/2019 1523   ISAR Score  4 Filed at: 08/03/2019 1523                          MDM  near syncope  Likely vasovagal  CBC, CMP, Troponin, EKG, CXR  No neuro deficits no need for CT at this time  IV fluids  If work up is negative pt can likely be discharged  Disposition  Final diagnoses:   Vasovagal near syncope     Time reflects when diagnosis was documented in both MDM as applicable and the Disposition within this note     Time User Action Codes Description Comment    8/3/2019  4:25 PM Will Reynoso Add [R55] Vasovagal near syncope       ED Disposition     ED Disposition Condition Date/Time Comment    Discharge Stable Sat Aug 3, 2019  4:47 PM Huan Raw discharge to home/self care              Follow-up Information     Follow up With Specialties Details Why [address] [place] [address], DO Internal Medicine Schedule an appointment as soon as possible for a visit   [address] [id number] [phone]            Patient's Medications   Discharge Prescriptions    No medications on file     No discharge procedures on file  ED Provider  Attending physically available and evaluated [facility]  I managed the patient along with the ED Attending      Electronically Signed by         Jovany Lynch MD  08/03/19 5034

## 2019-08-03 NOTE — ED NOTES
Patient transported to [facility], [address] [facility]  08/03/19 6508

## 2019-08-03 NOTE — DISCHARGE INSTRUCTIONS
Drink plenty of water  Stay out of the heat  Return to the ER for loss of consciousness, worsening lightheadedness, chest pain, shortness of breath, any other new or concerning symptoms

## 2019-08-05 LAB
ATRIAL RATE: 81 BPM
P AXIS: 61 DEGREES
PR INTERVAL: 150 MS
QRS AXIS: 50 DEGREES
QRSD INTERVAL: 68 MS
QT INTERVAL: 400 MS
QTC INTERVAL: 464 MS
T WAVE AXIS: 55 DEGREES
VENTRICULAR RATE: 81 BPM

## 2019-08-05 PROCEDURE — 93010 ELECTROCARDIOGRAM REPORT: CPT | Performed by: INTERNAL MEDICINE
